# Patient Record
Sex: MALE | Race: WHITE | Employment: UNEMPLOYED | URBAN - METROPOLITAN AREA
[De-identification: names, ages, dates, MRNs, and addresses within clinical notes are randomized per-mention and may not be internally consistent; named-entity substitution may affect disease eponyms.]

---

## 2019-04-01 ENCOUNTER — APPOINTMENT (OUTPATIENT)
Dept: PHYSICAL THERAPY | Age: 44
End: 2019-04-01

## 2019-04-01 ENCOUNTER — HOSPITAL ENCOUNTER (OUTPATIENT)
Dept: PHYSICAL THERAPY | Age: 44
End: 2019-04-01

## 2019-04-09 ENCOUNTER — HOSPITAL ENCOUNTER (OUTPATIENT)
Dept: PHYSICAL THERAPY | Age: 44
Discharge: HOME | End: 2019-04-09
Payer: MEDICAID

## 2019-04-09 PROCEDURE — 97530 THERAPEUTIC ACTIVITIES: CPT

## 2019-04-09 PROCEDURE — 97110 THERAPEUTIC EXERCISES: CPT

## 2019-04-09 PROCEDURE — 97162 PT EVAL MOD COMPLEX 30 MIN: CPT

## 2019-04-09 PROCEDURE — 97166 OT EVAL MOD COMPLEX 45 MIN: CPT

## 2019-04-09 NOTE — PROGRESS NOTES
PT DAILY TREATMENT NOTE/NEURO EVAL 10-18 Patient Name: Paige Ewing Date:2019 : 1975 [x]  Patient  Verified Payor: [facility] MEDICAID / Plan: [employer] / Product Type: Managed Care Medicaid / In time:905  Out time:950 Total Treatment Time (min): 45 Visit #: 1 of - Treatment Area: Stroke [facility]) [I63.9] Weakness [R53.1] SUBJECTIVE Pain Level (0-10 scale): 0 
[]constant []intermittent []improving []worsening []no change since onset Any medication changes, allergies to medications, adverse drug reactions, diagnosis change, or new procedure performed?: [x] No    [] Yes (see summary sheet for update) Subjective functional status/changes:    
PLOF: independent bathing and dressing Limitations to PLOF:  
Mechanism of Injury: stroke  Current symptoms/Complaints: left LE weakness, difficulty walking and getting in and out of tub Previous Treatmnt/Compliance:reports he has not had PT before PMHx/Surgical Hx:  
Work Hx: does not work Living Situation: lives in group home Pt Goals: \" walk better\" Barriers: []pain []financial []time [x]transportation []other Motivation:  
Substance use: []Alcohol []Tobacco []other:  
FABQ Score: []low []elevate Cognition: A & O x 3    Other: OBJECTIVE/EXAMINATION Domestic Life:  
Activity/Recreational Limitations: watching TV Mobility: ambulates without AD, unsteady Self Care: independent 25 min [x]Eval                  []Re-Eval  
 
 
8 min Therapeutic Exercise:  [] See flow sheet : HEP, discussed POC, educated on tone and dorsiflexion ROM for gait, educated on falls risk Rationale: increase ROM, increase strength and improve balance to improve the patients ability to complete ADLs with ease.   
 
12 min Therapeutic Activity:  []  See flow sheet : 5x STS, trial of left AFO ambulating in hallway, trial of SPC with CGA for 30 ft x3  
 Rationale: increase ROM, increase strength, improve coordination, improve balance and increase proprioception  to improve the patients ability to ambulate safely. With 
 [] TE 
 [] TA 
 [] neuro 
 [] other: Patient Education: [x] Review HEP [] Progressed/Changed HEP based on:  
[] positioning   [] body mechanics   [] transfers   [] heat/ice application   
[] other:   
 
Other Objective/Functional Measures:  
 
Physical Therapy Evaluation  Neurologic Posture: [] Poor    [x] Fair    [] Good    Describe:   Slumped, rounded shoulders Gait: [] Normal    [x] Abnormal    Device: without AD, with SPC Describe:midfoot initial contact, decreased left knee flexion during swing, and slight hip circumduction to clear left foot ROM:                             AROM    PROM Shoulder Left Right Left Right Flex Ext ABD      
ER      
IR      
 
       AROM    PROM Knee Left Right Left Right Ext Flex AROM                           PROM Hip Left Right Left Right Flex Ext ABD      
ER      
IR      
 
                                       AROM      PROM Ankle Left Right Left Right  
dorsiflexion   Lacking 5 degrees Flex Strength (MMT): 
Shoulder L (1-5) R (1-5) Shoulder Flexion 4/5 Shoulder Ext Shoulder ABD 4/5 Shoulder ADD Shoulder IR Shoulder ER Hip L (1-5) R (1-5) Hip Flexion 4/5 Hip Ext Hip ABD Hip ADD Hip ER Hip IR Knee L (1-5) R (1-5) Knee Flexion 4/5 Knee Extension 4/5 Ankle PF Ankle DF 2+/5 Other Tone: increased tone left ankle Motor Control: 
 
Sensation: 
 
Reflexes: [] Not Tested Left Right Biceps (C5) Brachioradiais (C6) Triceps (C7) Knee Jerk (L4) Ankle Jerk (SI) Balance/ Equilibrium: LOB with tandem eyes open, intact stepping strategy Left            Right Tracks Across Midline Reaches Across Midline Sitting Balance: Static:  [x] Good    [] Fair    [] Poor Dynamic:   [x] Good    [] Fair    [] Poor Standing Balance: Static:   [] Good    [x] Fair    [] Poor Dynamic:   [] Good    [x] Fair    [x] Poor Protective Extension:  [] Present    [] Delayed    [] Absent Single Leg Stance: Eyes Open  Eyes Closed L  L   
R  R Functional Mobility Bed Mobility:   
  Scooting:  
     Rolling: 
     Sit-Supine: 
    Transfers: 
     Sit-Stand: 
     Floor-Stand:  
    Gait: 
     Tandem: 
     Backwards: 
     Braiding: 
    Elevations: 
     Curbs: 
    Ramps: 
    Stairs: 
Good left LE strength Impaired coordination Behavior: [x] Cooperative    [] Impulsive    [] Agitated    [] Perseverative    [] Confused Oriented x: 
 
Cognition: [x] One Step Commands   [x] Multiple Commands   [] Displays Neglect [] R  [] L Other:  
    Impaired Judgement: [] Y    [] N Impaired Vision:  [] Y    [] N Safety Awareness Deficits  [] Y    [] N Impaired Hearing  [] Y    [] N Able to Express Needs [] Y    [] N Optional Tests: 
     Dynamic Gait Index (24pt scale): Functional Gait Assessment (30pt scale): Blanchard Balance Scale (56pt scale): Other test /comments: LOB with tandem, + intact stepping Moderate sway with romberg eyes closed Sit to stand 5 in 30 seconds with 1 UE Unable to perform STS without UE's PROM left ankle lacking 5 degrees dorsiflexion AROM 2+/5 within available range dorsiflexion Pain Level (0-10 scale) post treatment: 0 
 
ASSESSMENT/Changes in Function: reviewed HEP.  
 
Patient will continue to benefit from skilled PT services to modify and progress therapeutic interventions, address functional mobility deficits, address ROM deficits, address strength deficits, analyze and address soft tissue restrictions, analyze and cue movement patterns, address imbalance/dizziness and instruct in home and community integration to attain remaining goals. [x]  See Plan of Care 
[]  See progress note/recertification 
[]  See Discharge Summary Progress towards goals / Updated goals: 
See POC PLAN 
[]  Upgrade activities as tolerated     [x]  Continue plan of care 
[]  Update interventions per flow sheet      
[]  Discharge due to:_ 
[]  Other:_   
 
John Frost, PT 4/9/2019  9:10 AM

## 2019-04-09 NOTE — PROGRESS NOTES
[facility] [address] [facility] 
[phone] fax Plan of Care/Statement of Necessity for Occupational Therapy Services Patient name: April Lee Start of Care: 2019 Referral source: Purvi Matthews MD : 1975 Medical Diagnosis: Stroke [facility]) [I63.9] Weakness [R53.1] Payor: [facility] MEDICAID / Plan: [facility] COMMUNITY PLAN [facility] / Product Type: Managed Care Medicaid /  Onset Date:2018 Treatment Diagnosis: Weakness left UE  
Prior Hospitalization: see medical history Provider#: [id number] Medications: Verified on Patient summary List  
 Comorbidities: HIV, Hepatitis C, depression Prior Level of Function: Lives in group home, I self care, receives help with home care, meals, transportation, enjoys movies, does not work The Plan of Care and following information is based on the information from the initial evaluation. Assessment/ key information: 38year old RHD male who is assisted in medical history by caregiver and medical chart. He is not able to identify when he had CVA-MD reports 2018. He is living in group home. He is independent in self care and receives help for all home care and transportation. He has full AROM of BUEs. Strength in left is grossly 4/5. His  is 40# ( right 62), pinches are WFL. His fine motor skills and in hand skills are slowed relative to right hand at 35 seconds for 9 hole peg test ( right 30), and 158 for [place] Rate of Manipulation ( right 113). He reports a 27% limitation in left UE function on the Neuro Quality of Life for UE. He will benefit from skilled occupational therapy to improve Left UE strength and fine motor speed.    
 
Evaluation Complexity: History MEDIUM Complexity : Expanded review of history including physical, cognitive and psychosocial  history  Examination MEDIUM Complexity : 3-5 performance deficits relating to physical, cognitive , or psychosocial skils that result in activity limitations and / or participation restrictions Clinical Decision Making MEDIUM Complexity : Patient may present with comorbidities that affect occupational performnce. Miniml to moderate modification of tasks or assistance (eg, physical or verbal ) with assesment(s) is necessary to enable patient to complete evaluation Overall Complexity Rating: MEDIUM Problem List: Decreased strength and Decreased coordination/prehension Treatment Plan may include any combination of the following: Therapeutic exercise, Therapeutic activities, Patient education and ADLs/IADLs Patient / Family readiness to learn indicated by: trying to perform skills Persons(s) to be included in education:   patient (P) and family support person (FSP);list caregiver Barriers to Learning/Limitations: yes;  cognitive Patient Goal (s): Get stronger Patient Self Reported Health Status: fair Rehabilitation Potential: good Short Term Goals: To be accomplished in 2 weeks: 1. Patient will be independent in home exercise program for left UE strengthening. 2.  Patient will be independet in [facility] for fine motor and in hand manipulation skills. Long Term Goals: To be accomplished in 4 weeks: 1. Patient will increase  strength in left hand by *10**pounds to increase ease of opening jars 2. Patient will increase fine motor skills in left  hand by 10% to ease fasteners 3. Patient will report  improved left hand use for lifting as showm by decrease in limitaiton to below 15%. Frequency / Duration: Patient to be seen 2 times per week for *4** weeks: 
 
Patient/ Caregiver education and instruction: Diagnosis, prognosis, activity modification and exercises 
 [x]  Plan of care has been reviewed with JOEL Nicholas 4/9/2019 12:02 PM 
 
_____________________________________________________________________ I certify that the above Therapy Services are being furnished while the patient is under my care. I agree with the treatment plan and certify that this therapy is necessary. [de-identified] Signature:____________Date:_________TIME:________ 
 
[employer], Date and Time must be completed for valid certification ** Please sign and return to [address] [facility] 
[phone] fax

## 2019-04-09 NOTE — PROGRESS NOTES
OT DAILY TREATMENT NOTE 10-18 Patient Name: John Paul Spring Date:2019 : 1975 [x]  Patient  Verified Payor: [facility] MEDICAID / Plan: [facility] [employer] COMM[employer]ITY [employer]AN [place] [employer] / Product Type: Managed Care Medicaid / In time:1035  Out time:1115 Total Treatment Time (min): 40 Visit #: 1 of 8Treatment Area: Stroke [facility]) [I63.9] Weakness [R53.1] SUBJECTIVE Pain Level (0-10 scale): 0/10 Any medication changes, allergies to medications, adverse drug reactions, diagnosis change, or new procedure performed?: [x] No    [] Yes (see summary sheet for update) Subjective functional status/changes:   [] No changes reported Patient unable to give medical history OBJECTIVE 20 min [x]Eval                  []Re-Eval  
 
 
20 min Therapeutic Exercise:  [] See flow sheet :  
Rationale: increase strength to improve the patients ability to leift left arm 
BUE exercises 1# x 10 each x 2 sets With 
 [x] TE 
 [] TA 
 [] neuro 
 [] other: Patient Education: [x] Review HEP [] Progressed/Changed HEP based on: UE HEP [] positioning   [] body mechanics   [] transfers   [] heat/ice application  
[] Splint wear/care   [] Sensory re-education   [] scar management     
[] other:   
 
     
Other Objective/Functional Measures:  
Subjective: pt is a left hand dominant, 44 y.o.y/o, male who sustained his/her injury 2018 . Prior level of function: Watch movies, I self care, help with home care Pain level:(0-no pain 10-debilitating pain) no  
  
Current functional limitations/living situation: Group home, Medical hx: AIDs, depression, Hep C, 
 
Medications: See the written copy of this report in the patient's paper medical record. Objective: 
 
Sensation:No changes noted 
binu of Motion:WFL Strength: grossly 4/5 left UE Hand ROM WNL Hand Strength: 
 Gross Grasp 3pt Pinch Lateral Pinch Tip Pinch Right  62 16 19 10 Left 40 13 18 10 Nine-Hole Peg Test:  Left= _35____seconds  Right=__30___seconds [place] Left  158    Right 113 Finger Opposition:WNL ADLs Feeding:        []MaxA   []ModA   []Xavier   [] CGA   []SBA   []Camilla   [x]Independent UE Dressing:       []MaxA   []ModA   []Xavier   [] CGA   []SBA   []Camilla   [x]Independent LE Dressing:       []MaxA   []ModA   []Xavier   [] CGA   []SBA   []Camilla   [x]Independent Grooming:       []MaxA   []ModA   []Xavier   [] CGA   []SBA   []Camilla   [x]Independent Toileting:       []MaxA   []ModA   []Xavier   [] CGA   []SBA   []Camilla   [x]Independent Bathing:       []MaxA   []ModA   []Xavier   [] CGA   []SBA   []Camilla   [x]Independent Light Meal Prep:    [x]MaxA   []ModA   []Xavier   [] CGA   []SBA   []Camilla   []Independent Household/Other:  [x]MaxA   []ModA   []Xavier   [] CGA   []SBA   []Camilla   []Independent Adaptive Equip:     []MaxA   []ModA   []Xavier   [] CGA   []SBA   []Camilla   []Independent Driving:       [x]MaxA   []ModA   []Xavier   [] CGA   []SBA   []Camilla   []Independent Money Mgmt:        [x]MaxA   []ModA   []Xavier   [] CGA   []SBA   []Camilla   []Independent Coordination   GM 
                         FM []WFL          [x] Impaired []WFL          [x] Impaired Tone/Motor Control []WFL          [x] Impaired Midline Symmetry []WFL          [x] Impaired Visual Perception:                    R/L Neglect R/L Discrimination Body Scheme [x]WFL          [] Impaired  
[x]WFL          [] Impaired  
 
[x]WFL          [] Impaired  
[x]WFL          [] Impaired Visual Motor Skills Tracking Tracing  
                         Writing  
[x]WFL          [] Impaired  
 
[x]WFL          [] Impaired  
[x]WFL          [] Impaired Cognition []Person         []Place            []Date  
[]Situation/ Behavior Follows Commands  []     1-step  [] 2-step   []Multi-step Memory: STM 
                           LTM []WFL          [] Impaired []WFL          [x] Impaired Safety Awareness []WFL          [x] Impaired Judgment  []WFL          [x] Impaired Express Needs []WFL          [x] Impaired Pain Level (0-10 scale) post treatment: *0/10** ASSESSMENT/Changes in Function: * [x]  See Plan of Care 
[]  See progress note/recertification 
[]  See Discharge Summary PLAN 
[]  Upgrade activities as tolerated     []  Continue plan of care 
[]  Update interventions per flow sheet      
[]  Discharge due to:_ 
[]  Other:_ MASSIMO Butt/L 4/9/2019  10:32 AM 
 
No future appointments.

## 2019-04-09 NOTE — PROGRESS NOTES
[facility] [address] [facility] 
[phone] fax Plan of Care/ Statement of Necessity for Physical Therapy Services Patient name: Chloe Valdez Start of Care: 2019 Referral source: Colt Patel MD : 1975 Medical Diagnosis: Stroke [facility]) [I63.9] Weakness [R53.1] Payor: [facility] MEDICAID / Plan: [facility] [employer] COMMUNITY [employer]AN [employer] [employer] / Product Type: Managed Care Medicaid /  Onset Date:3/15/2019 Treatment Diagnosis: left LE weakness; gait impairment Prior Hospitalization: see medical history Provider#: [id number] Medications: Verified on Patient summary List  
 Comorbidities: HIV/AIDS, depression, tobacco use, stroke, hepatitis Prior Level of Function: lives in group home, independent with bathing/dressing, enjoys watching tv, ambulates without AD The Plan of Care and following information is based on the information from the initial evaluation. Assessment/ key information: Patient is a 38year-old male who presents with chief compliant of left LE weakness and impaired gait. Patient reports history of stroke in 2018. He lives in a group home and is independent with self care. He ambulates without AD; unsteady and occasionally reaching for support in environment. He denies history of falls. He presents with good left LE strength, but decreased left dorsiflexion strength about 2+/5 in availible range. He presents with tight gastroc/soleus and increased tone; PROM lacking ~5 deg of dorsiflexion. He ambulates with midfoot initial contact, decreased left knee flexion during swing, and slight hip circumduction to clear left foot. Patient presents with impaired static and dynamic balance; intact stepping strategy with LOB during MSR with eyes open and moderate sway with Romberg eyes closed.  Patient is unable to perform sit to stand transfers without UE support; he completes 5 sit to  30 seconds with 1 UE. Patient will benefit from skilled PT to address impairments listed above in order to improve safety and ease of ambulation. Evaluation Complexity History HIGH Complexity :3+ comorbidities / personal factors will impact the outcome/ POC ; Examination MEDIUM Complexity : 3 Standardized tests and measures addressing body structure, function, activity limitation and / or participation in recreation  ;Presentation MEDIUM Complexity : Evolving with changing characteristics  ; Clinical Decision Making MEDIUM Complexity : FOTO score of 26-74 Overall Complexity Rating: MEDIUM Problem List: pain affecting function Treatment Plan may include any combination of the following: Therapeutic exercise, Therapeutic activities, Neuromuscular re-education, Physical agent/modality, Gait/balance training, Manual therapy, Patient education, Functional mobility training, Home safety training and Stair training Patient / Family readiness to learn indicated by: trying to perform skills Persons(s) to be included in education: patient (P) Barriers to Learning/Limitations: None Patient Goal (s): Doris Oneill Patient Self Reported Health Status: fair Rehabilitation Potential: good Short Term Goals: To be accomplished in 1 weeks: 1. Patient will be compliant with HEP to improve therapy outcomes. Long Term Goals: To be accomplished in 8 weeks: 1. Patient will be able to maintain MSR on floor/foam with eyes open to improve safety with ambulation on uneven surfaces. 2. Patient will ambulate 350 with LRAD, good gait pattern, and no evidence of instability to improve ease of ambulation. 3. Patient will complete 8 sit to stand transfers within 30 seconds using 1 UE to improve ease of daily transfers. 4. Patient will increase FOTO score by 10 points, 62/100, to show improved functional mobility and QOL. Frequency / Duration: Patient to be seen 2-3 times per week for 8 weeks. Patient/ CarPatient/ Caregiver education and instruction: Diagnosis, prognosis, brace/ splint application and exercises 
 [x]  Plan of care has been reviewed with JUAN CARLOS Arteaga, PT 4/9/2019 7:14 PM 
 
________________________________________________________________________ I certify that the above Therapy Services are being furnished while the patient is under my care. I agree with the treatment plan and certify that this therapy is necessary. [de-identified] Signature:____________Date:_________TIME:________ 
 
[employer], Date and Time must be completed for valid certification ** Please sign and return to [address] [facility] 
[phone] fax

## 2019-04-12 ENCOUNTER — HOSPITAL ENCOUNTER (OUTPATIENT)
Dept: PHYSICAL THERAPY | Age: 44
Discharge: HOME | End: 2019-04-12
Payer: MEDICAID

## 2019-04-12 PROCEDURE — 97112 NEUROMUSCULAR REEDUCATION: CPT

## 2019-04-12 NOTE — PROGRESS NOTES
PT DAILY TREATMENT NOTE 10-18 Patient Name: Misty Sherman Date:2019 : 1975 [x]  Patient  Verified Payor: [facility] MEDICAID / Plan: [facility] COMMUNITY PLAN [facility] / Product Type: Managed Care Medicaid / In time:1:02  Out time:1:35 Total Treatment Time (min): 33 Visit #: 2 of - Treatment Area: Stroke [facility]) [I63.9] Weakness [R53.1] SUBJECTIVE Pain Level (0-10 scale): 0/10 Any medication changes, allergies to medications, adverse drug reactions, diagnosis change, or new procedure performed?: [x] No    [] Yes (see summary sheet for update) Subjective functional status/changes:   [] No changes reported Pt reports no pain but that his left leg stays pretty numb. He has a hard time picking up his foot when he walks. He states he tried the stretch to his calf at home OBJECTIVE 33 min Neuromuscular Re-education:  [x]  See flow sheet :  
Rationale: increase ROM, increase strength, improve coordination, improve balance and increase proprioception  to improve the patients ability to ambulate with improved gait and perform transfers with improved left LE use With 
 [] TE 
 [] TA 
 [] neuro 
 [] other: Patient Education: [x] Review HEP [] Progressed/Changed HEP based on:  
[] positioning   [] body mechanics   [] transfers   [] heat/ice application   
[] other:   
 
Other Objective/Functional Measures: 
BP: 90/74 (asymptomatic, pt had eaten, pt mentioned smoking may have caused it to be lower) no baseline at initial evaluation HR: 103 Dominated by extensor tone in standing; lack of DF, left knee hyperextension and circumduction for decreased flexion during swing Able to perform Toe raise in sitting outside of synergy pattern with resistance Worked on \"unlocking\" left knee in standing with facilitated steps to improve gait pattern; challenged with carryover outside of bars Worked on sit to stand from standard chair with left leg back to increase weightbearing and proprioception; able to perform without UEs after cueing but challenged with eccentric control No LOB with EO romberg foam; cues to prevent knee hyperextension Pain Level (0-10 scale) post treatment: 0/10 ASSESSMENT/Changes in Function: Initiated exercise program per POC. Pt ambulating without AD but dominated by extensor synergy causing lack of DF, knee flexion during swing and left knee hyperextension at midstance. Will work to improve gait pattern for decreased fall risk and improve left LE strength for ease of transfers for better safety and independence. Short Term Goals: To be accomplished in 1 weeks: 1. Patient will be compliant with HEP to improve therapy outcomes. Met (4/12/19) Long Term Goals: To be accomplished in 8 weeks: 1. Patient will be able to maintain MSR on floor/foam with eyes open to improve safety with ambulation on uneven surfaces. 2. Patient will ambulate 350 with LRAD, good gait pattern, and no evidence of instability to improve ease of ambulation. 3. Patient will complete 8 sit to stand transfers within 30 seconds using 1 UE to improve ease of daily transfers. 4. Patient will increase FOTO score by 10 points, 62/100, to show improved functional mobility and QOL. PLAN [x]  Upgrade activities as tolerated     [x]  Continue plan of care 
[]  Update interventions per flow sheet      
[]  Discharge due to:_ 
[]  Other:_ Celio Xavier PTA 4/12/2019  1:57 PM 
 
Future Appointments Date Time Provider Susan Luther 4/16/2019  7:30 AM Denys Olivares PT MMCPTPB [facility]  
4/16/2019  8:15 AM Stepan Poplar, OTR/L [facility]CPTP[facility]  
4/23/2019 12:30 PM Andris Koyanagi, PTA [facility]  
4/30/2019  3:00 PM Andris Koyanagi, PTA [facility]CP[facility]  
4/30/2019  3:30 PM Stepan Poplar, OTR/L MMCPTPB [facility]  
5/3/2019  3:00 PM Andris Koyanagi, PTA [facility]CPTP[facility]  
5/3/2019  3:30 PM Stepan Alejandro, OTR/L [facility]CPTP[facility]  
5/6/2019 10:15 AM Jakub Nava MMCPTPB [facility]  
 5/6/2019 11:00 AM Blake Mccollum, PT [id number] [facility]  
5/10/2019  9:45 AM Senait Parry [id number] [facility]  
5/10/2019 10:30 AM Blake Mccollum, PT [id number] [facility]  
5/13/2019  2:00 PM Shane Mendes, PT [id number] [facility]  
5/13/2019  2:30 PM Kayleigh Wolf, OTR/L MMCPTPB [facility]  
5/17/2019 10:30 AM Shane Mendes, PT MMCPTPB [facility]  
5/20/2019 10:30 AM Marko Mcclure PTA [facility]CPTP[facility]  
5/24/2019 10:30 AM Farhad Green, PT MMCPTPB [facility]

## 2019-04-16 ENCOUNTER — HOSPITAL ENCOUNTER (OUTPATIENT)
Dept: PHYSICAL THERAPY | Age: 44
Discharge: HOME | End: 2019-04-16
Payer: MEDICAID

## 2019-04-16 PROCEDURE — 97116 GAIT TRAINING THERAPY: CPT

## 2019-04-16 PROCEDURE — 97110 THERAPEUTIC EXERCISES: CPT

## 2019-04-16 PROCEDURE — 97530 THERAPEUTIC ACTIVITIES: CPT

## 2019-04-16 NOTE — PROGRESS NOTES
PT DAILY TREATMENT NOTE 10-18 Patient Name: Cinthya Yu Date:2019 : 1975 [x]  Patient  Verified Payor: [facility] MEDICAID / Plan: [facility] COMMUNITY PLAN [facility] / Product Type: Managed Care Medicaid / In time:7:29  Out time:8:06 Total Treatment Time (min): 37 Visit #: 3 of  Treatment Area: Stroke [facility]) [I63.9] Weakness [R53.1] SUBJECTIVE Pain Level (0-10 scale): 0/10 Any medication changes, allergies to medications, adverse drug reactions, diagnosis change, or new procedure performed?: [x] No    [] Yes (see summary sheet for update) Subjective functional status/changes:   [] No changes reported Pt reports that he has not had any falls, but he stumbles. He has a wheelchair at home, but he does not have any other AD at home. His left shoe is too tight. His left shoe swells at times. OBJECTIVE 14 min Therapeutic Exercise:  [] See flow sheet :  
Rationale: increase ROM, increase strength, improve coordination, improve balance and increase proprioception to improve the patients ability to improve ease/safety with ambulation 23 min Gait Training: ambulation with SPC, SBQC, and SPC with AFO on left, Eliane to CGA Rationale: to improve safety with ambulation and reduce fall risk With 
 [] TE 
 [] TA 
 [] neuro 
 [] other: Patient Education: [x] Review HEP [] Progressed/Changed HEP based on:  
[] positioning   [] body mechanics   [] transfers   [] heat/ice application   
[] other:   
 
Other Objective/Functional Measures:  
 
Midfoot contact with initial contact and decreased left foot clearance with ambulation Improved foot clearance with SPC, required cuing for sequencing with SPC and to avoid placing SPC in front of right foot Trialed ambulation with SBQC but pt was challenged with sequencing and did not place all 4 points on ground Trailed AFO on left, increased left foot clearance when ambulating with SPC and AFO 
 Continued to require mild verbal cues for sequencing with SPC, but sequencing improved with continued reps. Left knee hyperextension during stance phase on left Left knee hyperextension with step ups, reduced with tactile cues at posterior knee No redness, tenderness, pitting edema, or increased temp B calves Educated pt regarding signs/sx of DVT and go to emergency room is signs/sx Pain Level (0-10 scale) post treatment: 0/10 ASSESSMENT/Changes in Function:  
 
Pt is making slow progress towards initial goals in therapy. He demonstrated increased left foot clearance when ambulating with left AFO and SPC on right. Genu recurvatum continues to be evident on left during stance phase; however, reduced hyperextension noted when provided with tactile cues at posterior knee when wearing AFO. Plantarflexor spasticity is likely contributing to knee hyperextension during stance phase. Will continue to address strength, proprioception, and static/dynamic balance deficits in order to reduce fall risk and improve ease/safety with daily tasks. Patient will continue to benefit from skilled PT services to modify and progress therapeutic interventions, address functional mobility deficits, address ROM deficits, address strength deficits, analyze and address soft tissue restrictions, analyze and cue movement patterns, assess and modify postural abnormalities, address imbalance/dizziness and instruct in home and community integration to attain remaining goals. Progress towards goals / Updated goals: 
1. Patient will be compliant with HEP to improve therapy outcomes. Met (4/12/19) Long Term Goals: To be accomplished in 8 weeks: 1. Patient will be able to maintain MSR on floor/foam with eyes open to improve safety with ambulation on uneven surfaces. 2. Patient will ambulate 350 with LRAD, good gait pattern, and no evidence of instability to improve ease of ambulation. Progressing.   Improved foot clearance on left when ambulation with left AFO and SPC held on right 4/16/19 3. Patient will complete 8 sit to stand transfers within 30 seconds using 1 UE to improve ease of daily transfers. 4. Patient will increase FOTO score by 10 points, 62/100, to show improved functional mobility and QOL. PLAN 
[]  Upgrade activities as tolerated     []  Continue plan of care 
[]  Update interventions per flow sheet      
[]  Discharge due to:_ 
[]  Other:_ Camila Mattson, PT 4/16/2019  8:09 AM 
 
Future Appointments Date Time Provider Susan Luther 4/16/2019  8:15 AM Leonel Marc, OTR/L MMCPTPB [facility]  
4/23/2019 12:30 PM Shona Warner, JUAN CARLOS MMCPTPB [facility]  
4/30/2019  3:00 PM Shona Warner, PTA MMCPTPB [facility]  
4/30/2019  3:30 PM Leonel Marc, OTR/L MMCPTPB [facility]  
5/3/2019  3:00 PM Shona Warner, JUAN CARLOS MMCPTPB [facility]  
5/3/2019  3:30 PM Leonel Marc OTR/L [facility]CPTP[facility]  
5/6/2019 10:15 AM Margeret Meter ROEOZIJ [facility]  
5/6/2019 11:00 AM Shawanda Lopez PT MMCPTPB [facility]  
5/10/2019  9:45 AM Margeret Meter [id number] [facility]  
5/10/2019 10:30 AM Shawanda Lopez PT [id number] [facility]  
5/13/2019  2:00 PM Chirag Mccloud, PT MMCPTPB [facility]  
5/13/2019  2:30 PM Leonel Marc, OTR/L [facility]CPTPB [facility]  
5/17/2019 10:30 AM Chirag Mccloud, PT MMCPTPB [facility]  
5/20/2019 10:30 AM Shona Warner, JUAN CARLOS MMCPTPB [facility]  
5/24/2019 10:30 AM Rubi Jones, PT MMCPTPB [facility]

## 2019-04-16 NOTE — PROGRESS NOTES
OT DAILY TREATMENT NOTE 10-18 Patient Name: Saran Wayne Date:2019 : 1975 [x]  Patient  Verified Payor: [facility] MEDICAID / Plan: [facility] COMMUNITY PLAN [facility] / Product Type: Managed Care Medicaid / In time:818  Out time:900 Total Treatment Time (min): 42 Visit #: 2 of 8 Treatment Area: Stroke [facility]) [I63.9] Weakness [R53.1] SUBJECTIVE Pain Level (0-10 scale): 0/10 Any medication changes, allergies to medications, adverse drug reactions, diagnosis change, or new procedure performed?: [x] No    [] Yes (see summary sheet for update) Subjective functional status/changes:   [] No changes reported Just tired Caregiver reports this is a little early for patient OBJECTIVE 30 min Therapeutic Exercise:  [] See flow sheet :  
Rationale: increase strength to improve the patients ability to lift Increased weights to 2# with no rests required x 15 Green therabar x 15 sup pro and flex ext shoulder level ER/IR x 15 Blue web  pull left 15, bow pull left 15, lumb pull left 15 Gripper 55# left hand x 25 1 inch pegs 12 min Therapeutic Activity:  []  See flow sheet :  
Rationale: improve coordination  to improve the patients ability to manipulate small items [employer] 10 sets each hand Placed 25 1 in pegs left hand With 
 [] TE 
 [] TA 
 [] neuro 
 [] other: Patient Education: [x] Review HEP [] Progressed/Changed HEP based on: UE HEP [] positioning   [] body mechanics   [] transfers   [] heat/ice application  
[] Splint wear/care   [] Sensory re-education   [] scar management     
[] other:   
 
     
Other Objective/Functional Measures:  
[place] left hand 171 ( right 189 but distracted by questions) Pain Level (0-10 scale) post treatment: 0/10 ASSESSMENT/Changes in Function: Improving strength Patient will continue to benefit from skilled OT services to address strength deficits and analyze and cue movement patterns to attain remaining goals. []  See Plan of Care 
[]  See progress note/recertification 
[]  See Discharge Summary Progress towards goals / Updated goals: *1.  Patient will be independent in home exercise program for left UE strengthening. reviewed 4/16/19 2. Patient will be independet in HEP for fine motor and in hand manipulation skills. 
  
Long Term Goals: To be accomplished in 4 weeks: 1. Patient will increase  strength in left hand by *10**pounds to increase ease of opening jars 2. Patient will increase fine motor skills in left  hand by 10% to ease fasteners 3. Patient will report  improved left hand use for lifting as showm by decrease in limitaiton to below 15%. 
 * 
 
PLAN 
[]  Upgrade activities as tolerated     [x]  Continue plan of care 
[]  Update interventions per flow sheet      
[]  Discharge due to:_ 
[]  Other:_ Joaquim Ramirez, OTR/L 4/16/2019  8:14 AM 
 
Future Appointments Date Time Provider Susan Luther 4/16/2019  8:15 AM Kayleigh Wolf, OTR/L MMCPTPB [facility]  
4/23/2019 12:30 PM Marko Mcclure, PTA MMCPTPB [facility]  
4/30/2019  3:00 PM Marko Mcclure, PTA MMCPTPB [facility]  
4/30/2019  3:30 PM Kayleigh Wolf, OTR/L MMCPTPB [facility]  
5/3/2019  3:00 PM Marko Mcclure, PTA MMCPTPB [facility]  
5/3/2019  3:30 PM Kayleigh Wolf, OTR/L MMCPTPB [facility]  
5/6/2019 10:15 AM Senait Parry SLSGGIT [facility]  
5/6/2019 11:00 AM Blake Mccollum PT MMCPTPB [facility]  
5/10/2019  9:45 AM Senait Parry [id number] [facility]  
5/10/2019 10:30 AM Blake Mccollum, PT [id number] [facility]  
5/13/2019  2:00 PM Shane Mendes, PT MMCPTPB [facility]  
5/13/2019  2:30 PM Kayleigh Wolf, OTR/L MMCPTPB [facility]  
5/17/2019 10:30 AM Shane Mendes, PT MMCPTPB [facility]  
5/20/2019 10:30 AM Marko Mcclure, PTA MMCPTPB [facility]  
5/24/2019 10:30 AM Farhad Green, PT MMCPTPB [facility]

## 2019-04-23 ENCOUNTER — APPOINTMENT (OUTPATIENT)
Dept: PHYSICAL THERAPY | Age: 44
End: 2019-04-23

## 2019-04-30 ENCOUNTER — HOSPITAL ENCOUNTER (OUTPATIENT)
Dept: PHYSICAL THERAPY | Age: 44
Discharge: HOME | End: 2019-04-30
Payer: MEDICAID

## 2019-04-30 PROCEDURE — 97530 THERAPEUTIC ACTIVITIES: CPT

## 2019-04-30 PROCEDURE — 97110 THERAPEUTIC EXERCISES: CPT

## 2019-04-30 PROCEDURE — 97112 NEUROMUSCULAR REEDUCATION: CPT

## 2019-05-06 ENCOUNTER — HOSPITAL ENCOUNTER (OUTPATIENT)
Dept: PHYSICAL THERAPY | Age: 44
Discharge: HOME | End: 2019-05-06
Payer: MEDICAID

## 2019-05-06 PROCEDURE — 97530 THERAPEUTIC ACTIVITIES: CPT

## 2019-05-06 PROCEDURE — 97535 SELF CARE MNGMENT TRAINING: CPT

## 2019-05-06 PROCEDURE — 97110 THERAPEUTIC EXERCISES: CPT

## 2019-05-06 NOTE — PROGRESS NOTES
OT DAILY TREATMENT NOTE 10-18 Patient Name: Paige Ewing Date:2019 : 1975 [x]  Patient  Verified Payor: [facility] MEDICAID / Plan: [facility] COMMUNITY PLAN [facility] / Product Type: Managed Care Medicaid / In time:1015  Out time:1055 Total Treatment Time (min): 40 Visit #: 4 of 8 Treatment Area: Stroke [facility]) [I63.9] Weakness [R53.1] SUBJECTIVE Pain Level (0-10 scale): 0/10 Any medication changes, allergies to medications, adverse drug reactions, diagnosis change, or new procedure performed?: [x] No    [] Yes (see summary sheet for update) Subjective functional status/changes:   [] No changes reported I don't feel any different I forgot my brace ( AFO) OBJECTIVE 22 min Therapeutic Exercise:  [] See flow sheet :  
Rationale: increase strength to improve the patients ability to grsp Recheck  left Blue therabar x 15 sup pro Black web  pull bow pull and lumb pull 10 min Therapeutic Activity:  []  See flow sheet :  
Rationale: improve coordination  to improve the patients ability to manipulate items Recheck 9 hole and [place] left hand 8 mins  Self Care FOTO to assess limitation in fine motor skills for self care With 
 [] TE 
 [] TA 
 [] neuro 
 [] other: Patient Education: [x] Review HEP [] Progressed/Changed HEP based on: progress made 
[] positioning   [] body mechanics   [] transfers   [] heat/ice application  
[] Splint wear/care   [] Sensory re-education   [] scar management     
[] other:   
 
     
Other Objective/Functional Measures:  
9 hole left 29 [place] 150  48 Pain Level (0-10 scale) post treatment: 0/10 ASSESSMENT/Changes in Function: Improving in hand and , function per [facility] Patient will continue to benefit from skilled OT services to modify and progress therapeutic interventions, address strength deficits, analyze and cue movement patterns and instruct in home and community integration to attain remaining goals. []  See Plan of Care 
[]  See progress note/recertification 
[]  See Discharge Summary Progress towards goals / Updated goals: *1.  Patient will be independent in home exercise program for left UE strengthening. reviewed 4/16/19 2.  Patient will be independent in HEP for fine motor and in hand manipulation skills. 
  
Long Term Goals: To be accomplished in 4 weeks:         
1.  Patient will increase  strength in left hand by *10**pounds to increase ease of opening jarsprogressing increased 8# 2.  Patient will increase fine motor skills in left  hand by 10% to ease fastenersprogressing 5/6/19 3.  Patient will report  improved left hand use for lifting as showm by decrease in limitaiton to below 15%. Met 5/6/19 ** PLAN 
[]  Upgrade activities as tolerated     [x]  Continue plan of care 
[]  Update interventions per flow sheet      
[]  Discharge due to:_ 
[]  Other:_ MASSIMO Gamez/L 5/6/2019  10:15 AM 
 
Future Appointments Date Time Provider Susan Luther 5/6/2019 11:00 AM Donaldo Sánchez, PT [id number] [facility]  
5/10/2019  9:45 AM Trude Mates [id number] [facility]  
5/10/2019 10:30 AM Donaldo Sánchez, PT [id number] [facility]  
5/13/2019  2:00 PM Min De La Rosa PT [id number] [facility]  
5/13/2019  2:30 PM Juan Rivera OTR/L MMCPTPB [facility]  
5/17/2019 10:30 AM Min De La Rosa PT MMCPTPB [facility]  
5/20/2019 10:30 AM Kush Gant, PTA MMCPTPB [facility]  
5/24/2019 10:30 AM Jeovanny Leon, PT MMCPTPB [facility]

## 2019-05-06 NOTE — PROGRESS NOTES
[facility] [address] [facility] 
[phone] fax Physical Therapy Progress Note Patient name: Paige Ewing Start of Care: 2019 Referral source: Laura Levine MD : 1975 Medical Diagnosis: Stroke [facility]) [I63.9] Weakness [R53.1] Payor: [facility] MEDICAID / Plan: [facility] COMMUNITY PLAN [facility] / Product Type: Managed Care Medicaid /  Onset Date:3/15/2019 Treatment Diagnosis: left LE weakness; gait impairment Prior Hospitalization: see medical history Provider#: [id number] Medications: Verified on Patient summary List  
 Comorbidities: HIV/AIDS, depression, tobacco use, stroke, hepatitis Prior Level of Function: lives in group home, independent with bathing/dressing, enjoys watching tv, ambulates without AD Visits from Start of Care: 5    Missed Visits: 1 Established Goals:         Excellent           Good         Limited           None 
[] Increased ROM   []  []  []  [] 
[] Increased Strength  []  []  []  [] 
[x] Increased Mobility  []  []  [x]  []  
[] Decreased Pain   []  []  []  [] 
[] Decreased Swelling  []  []  []  [] 
 
Key Functional Changes:  
Functional improvements: 0%; pt reports no improvements since start of care, he denies improvement in strength/balance/gait/ transfers Functional deficits: \" wants to walk right\" FOTO scored improved by 11 pts Pt able to complete 6x sit to  30 seconds with 1 UE( 5x at initial evaluation) Maintains MSR on floor for 15 seconds Updated Goals: to be achieved in 6 weeks: 1. Patient will be able to maintain MSR on floor/foam with eyes open for 30 seconds to improve safety with ambulation on uneven surfaces. 2. Patient will ambulate 350 with LRAD, good gait pattern, and no evidence of instability to improve ease of ambulation.   
3. Patient will complete 8 sit to stand transfers within 30 seconds using 1 UE to improve ease of daily transfers. ASSESSMENT/RECOMMENDATIONS: Patient is making slow progress towards long term goals. He demonstrates improved sit to  30 seconds with 1 UE and his FOTO score improved by 11 pts. However, pt reports 0% improvement and denies improved ease of transfers/gait/ ADL's. Patient demonstrates improved foot clearance when ambulating with left AFO and SPC. Patient reports he does not have any assisted devices at home. Patient will continue to benefit from skilled PT to improve gait pattern, proprioception, strength, and balance to improve ease and safety of community ambulation. Recommend pt obtain [facility] to improve carry-over. [x]Continue therapy per initial plan/protocol at a frequency of  2-3 x per week for 6 weeks []Continue therapy with the following recommended changes:____________________      _____________________________________________________________________ []Discontinue therapy progressing towards or have reached established goals []Discontinue therapy due to lack of appreciable progress towards goals []Discontinue therapy due to lack of attendance or compliance []Await Physician's recommendations/decisions regarding therapy []Other:________________________________________________________________ Thank you for this referral.  
Jeovanny Sanford, PT 5/6/2019 2:47 PM 
 
NOTE TO PHYSICIAN:  PLEASE COMPLETE THE ORDERS BELOW AND  
FAX TO [facility]: [phone] If you are unable to process this request in 24 hours please contact our office: [phone] ? I have read the above report and request that my patient continue as recommended. ? I have read the above report and request that my patient continue therapy with the following changes/special instructions:____________________________________ ? I have read the above report and request that my patient be discharged from therapy. Physicians signature: ______________________________Date: ______Time:______

## 2019-05-06 NOTE — PROGRESS NOTES
PT DAILY TREATMENT NOTE 10-18 Patient Name: Steven Allred Date:2019 : 1975 [x]  Patient  Verified Payor: [facility] MEDICAID / Plan: [facility] [employer] COMMUNITY PLAN [employer] / Product Type: Managed Care Medicaid / In time:1100  Out time:1131 Total Treatment Time (min): 31 Visit #: 5 of  Treatment Area: Stroke [facility]) [I63.9] Weakness [R53.1] SUBJECTIVE Pain Level (0-10 scale): 0/10 Any medication changes, allergies to medications, adverse drug reactions, diagnosis change, or new procedure performed?: [x] No    [] Yes (see summary sheet for update) Subjective functional status/changes:   [] No changes reported Pt reports that he does not use AFO when at home and does not have AD. OBJECTIVE 11 min Therapeutic Exercise:  [x] See flow sheet :  
Rationale: increase ROM, increase strength, improve coordination, improve balance and increase proprioception to improve the patients ability to improve ease/safety with ambulation 20 min Therapeutic activities: FOTO reassessment, 30 seconds sit to stand test, ambulation with SBQC/SPC with CGA Rationale: to improve ease of functional mobility, improve safety with ambulation and reduce fall risk With 
 [] TE 
 [] TA 
 [] neuro 
 [] other: Patient Education: [x] Review HEP [] Progressed/Changed HEP based on:  
[] positioning   [] body mechanics   [] transfers   [] heat/ice application   
[] other:   
 
Other Objective/Functional Measures:  
Functional improvements: 0 % pt reports no improvement since [facility], denies improvement in strength/balance/ADLs Functional deficits: \" wants to walk right\" Does not use AFO at home FOTO score improved by 11 pts Cues needed for sequencing when ambulating with cane Pain Level (0-10 scale) post treatment: 0/10 ASSESSMENT/Changes in Function:  See progress note.  
 
Patient will continue to benefit from skilled PT services to modify and progress therapeutic interventions, address functional mobility deficits, address ROM deficits, address strength deficits, analyze and address soft tissue restrictions, analyze and cue movement patterns, assess and modify postural abnormalities, address imbalance/dizziness and instruct in home and community integration to attain remaining goals. Progress towards goals / Updated goals: 
1. Patient will be compliant with HEP to improve therapy outcomes. Met (4/12/19) Long Term Goals: To be accomplished in 8 weeks: 1. Patient will be able to maintain MSR on floor/foam with eyes open to improve safety with ambulation on uneven surfaces. 15 seconds MSR on floor Progressing, able to maintain MSR on floor for about 15 seconds 2. Patient will ambulate 350 with LRAD, good gait pattern, and no evidence of instability to improve ease of ambulation. Progressing. Improved foot clearance on left when ambulation with left AFO and SPC held on right 4/16/19 3. Patient will complete 8 sit to stand transfers within 30 seconds using 1 UE to improve ease of daily transfers. Progressing, 6x with 1 UE  
4. Patient will increase FOTO score by 10 points, 62/100, to show improved functional mobility and QOL. Met, 11 pt improvement PLAN 
[]  Upgrade activities as tolerated     [x]  Continue plan of care 
[]  Update interventions per flow sheet      
[]  Discharge due to:_ 
[]  Other:_   
 
John Frost, PT 5/6/2019  8:09 AM 
 
Future Appointments Date Time Provider Susan Luther 5/6/2019 11:00 AM Steve Alexander PT [id number] [facility]  
5/10/2019  9:45 AM Martha Sevilla [id number] [facility]  
5/10/2019 10:30 AM Steve Alexander PT OIDCXNS [facility]  
5/13/2019  2:00 PM Rene Felton, PT VQHMRRVALARIE [facility]  
5/13/2019  2:30 PM Cristiane Solomon OTR/L MMCPTPB [facility]  
5/17/2019 10:30 AM Rene Felton PT MMCPTCARMEN [facility]  
5/20/2019 10:30 AM Heaven Tomlinson, PTA MMCPTPB [facility]  
5/24/2019 10:30 AM Mahsa Membreno, PT MMCPTPB [facility]

## 2019-05-10 ENCOUNTER — HOSPITAL ENCOUNTER (OUTPATIENT)
Dept: PHYSICAL THERAPY | Age: 44
End: 2019-05-10
Payer: MEDICAID

## 2019-05-10 ENCOUNTER — HOSPITAL ENCOUNTER (OUTPATIENT)
Dept: PHYSICAL THERAPY | Age: 44
Discharge: HOME | End: 2019-05-10
Payer: MEDICAID

## 2019-05-10 PROCEDURE — 97110 THERAPEUTIC EXERCISES: CPT

## 2019-05-10 PROCEDURE — 97530 THERAPEUTIC ACTIVITIES: CPT

## 2019-05-10 NOTE — PROGRESS NOTES
OT DAILY TREATMENT NOTE 10-18 Patient Name: Cinthya Yu Date:5/10/2019 : 1975 [x]  Patient  Verified Payor: [facility] MEDICAID / Plan: [facility] PLAN [facility] / Product Type: Managed Care Medicaid / In time:950  Out time:1027 Total Treatment Time (min): 37 Visit #: 5 of 8 Treatment Area: Stroke [facility]) [I63.9] Weakness [R53.1] SUBJECTIVE Pain Level (0-10 scale): 0/10 Any medication changes, allergies to medications, adverse drug reactions, diagnosis change, or new procedure performed?: [x] No    [] Yes (see summary sheet for update) Subjective functional status/changes:   [] No changes reported Pt reports non compliance with HEP OBJECTIVE 27 min Therapeutic Exercise:  [] See flow sheet :  
Rationale: increase ROM and increase strength to improve the patients ability to lift, , reach UE HEP with 2#, 10x each Blue Therabar: 3 ways, 10x Black Web: Lumb, grasp, bow 15x each 10 min Therapeutic Activity:  []  See flow sheet :  
Rationale: increase ROM and improve coordination  to improve the patients ability to manipulate small items [employer], Timed Grooved pegs, timed With 
 [] TE 
 [] TA 
 [] neuro 
 [] other: Patient Education: [x] Review HEP [] Progressed/Changed HEP based on:  
[] positioning   [] body mechanics   [] transfers   [] heat/ice application  
[] Splint wear/care   [] Sensory re-education   [] scar management     
[] other:   
 
     
Other Objective/Functional Measures:  
 
Grooved Pegs: 
 10 Change Right 107 Left 113 [phone] [facility] 10 sets  5/10 Change Right 189 Left 171 180 Pain Level (0-10 scale) post treatment: 0/10 ASSESSMENT/Changes in Function: Slowed FM speed on this date Patient will continue to benefit from skilled OT services to modify and progress therapeutic interventions, address ROM deficits, address strength deficits and instruct in home and community integration to attain remaining goals. []  See Plan of Care 
[]  See progress note/recertification 
[]  See Discharge Summary Progress towards goals / Updated goals: *1.  Patient will be independent in home exercise program for left UE strengthening. reviewed 4/16/19, Non compliant with HEP at this time per Pt report 5/10/19 2.  Patient will be independent in HEP for fine motor and in hand manipulation skills. 
  
Long Term Goals: To be accomplished in 4 weeks:         
1.  Patient will increase  strength in left hand by 10 pounds to increase ease of opening jars progressing with in clinic activities 5/10/19 2.  Patient will increase fine motor skills in left  hand by 10% to ease fasteners progressing 5/6/19 Decreased speed on this date 5/10/19 3.  Patient will report  improved left hand use for lifting as showm by decrease in limitaiton to below 15%. Met 5/6/19 PLAN 
[]  Upgrade activities as tolerated     [x]  Continue plan of care 
[]  Update interventions per flow sheet      
[]  Discharge due to:_ 
[]  Other:_ SHAHIDA Hernandez 5/10/2019  9:54 AM 
 
Future Appointments Date Time Provider Susan Luther 5/13/2019  2:00 PM Ar Salas, PT MMCPTPB [facility]  
5/13/2019  2:30 PM JOEL Viramontes MMCPTPB [facility]  
5/17/2019 10:30 AM Ar Salas, PT MMCPTPB [facility]  
5/20/2019 10:30 AM Jarad Diaz, PTA [facility]CPTPB [facility]  
5/24/2019 10:30 AM Sharri Yarbrough, PT MMCPTPB [facility]

## 2019-05-13 NOTE — PROGRESS NOTES
[facility] [address] [facility] 
[phone] fax Physical Therapy Discharge Summary Patient name: Jose Gibbons Start of Care:  19 Referral source: Agnes Anthony MD : 1975 Medical/Treatment Diagnosis: Stroke [facility]) [I63.9] Weakness [R53.1] Payor: [facility] MEDICAID / Plan: [facility] COMMUNITY PLAN [facility] / Product Type: Managed Care Medicaid /  Onset Date: 3/15/19 Prior Hospitalization: see medical history Provider#: [id number] Medications: Verified on Patient Summary List   
Comorbidities:  HIV/AIDS, depression, tobacco use, stroke, hepatitis  
Prior Level of Function:  lives in group home, independent with bathing/dressing, enjoys watching tv, ambulates without AD   
 
Visits from Start of Care: 5    Missed Visits: 4 Reporting Period :  19 to 19 Summary of Care: 
Goal: Patient will be able to maintain MSR on floor/foam with eyes open for 30 seconds to improve safety with ambulation on uneven surfaces.  
Status at last note/certification: 15 seconds Status at discharge: not met Goal:  Patient will ambulate 350 with LRAD, good gait pattern, and no evidence of instability to improve ease of ambulation. Status at last note/certification: unable Status at discharge: not met Goal: Patient will complete 8 sit to stand transfers within 30 seconds using 1 UE to improve ease of daily transfers.  
Status at last note/certification: 6x 
Status at discharge: not met Pt. Did not return to PT after last progress note, so will be D/C at this time. Goals were unable to be re-assessed secondary to unplanned D/C. He was educated on a HEP at start of care. Per last progress note \" Patient is making slow progress towards long term goals. He demonstrates improved sit to  30 seconds with 1 UE and his FOTO score improved by 11 pts.  However, pt reports 0% improvement and denies improved ease of transfers/gait/ ADL's. Patient demonstrates improved foot clearance when ambulating with left AFO and SPC. Patient reports he does not have any assisted devices at home. Patient will continue to benefit from skilled PT to improve gait pattern, proprioception, strength, and balance to improve ease and safety of community ambulation\" ASSESSMENT/RECOMMENDATIONS: 
[x]Discontinue therapy: []Patient has reached or is progressing toward set goals [x]Patient is non-compliant or has abdicated 
    []Due to lack of appreciable progress towards set goals Clay Noble, PT 5/13/2019 2:17 PM

## 2019-05-17 ENCOUNTER — APPOINTMENT (OUTPATIENT)
Dept: PHYSICAL THERAPY | Age: 44
End: 2019-05-17
Payer: MEDICAID

## 2019-05-20 ENCOUNTER — APPOINTMENT (OUTPATIENT)
Dept: PHYSICAL THERAPY | Age: 44
End: 2019-05-20
Payer: MEDICAID

## 2019-05-24 ENCOUNTER — APPOINTMENT (OUTPATIENT)
Dept: PHYSICAL THERAPY | Age: 44
End: 2019-05-24
Payer: MEDICAID

## 2019-05-29 NOTE — PROGRESS NOTES
[facility] Sridhar Montes  [address] [facility]  [phone] fax    Occupational Therapy Discharge Summary    Patient name: Arias Gray Start of Care: 2019   Referral source: Rylie Nicole MD : 1975   Medical/Treatment Diagnosis: Stroke ([address] [I63.9]  Weakness [R53.1]  Payor: [facility] MEDICAID / Plan: [facility] COMMUNITY PLAN [employer] / Product Type: Managed Care Medicaid /  Onset Date:2018     Prior Hospitalization: see medical history Provider#: [id number]   Medications: Verified on Patient Summary List     Prior Level of Function: Lives in group home, I self care, receives help with home care, meals, transportation, enjoys movies, does not work     Visits from Start of Care: 5    Missed Visits: 2  Reporting Period : 2019 to 5/10/2019    Summary of Care:  *1.  Patient will be independent in home exercise program for left UE strengthening. reviewed 19, Non compliant with HEP at this time per Pt report 5/10/19  2.  Patient will be independent in HEP for fine motor and in hand manipulation skills. Long Term Goals: To be accomplished in 4 weeks:          1.  Patient will increase  strength in left hand by 10 pounds to increase ease of opening jars progressing with in clinic activities 5/10/19  2.  Patient will increase fine motor skills in left  hand by 10% to ease fasteners progressing 19 Decreased speed on this date 5/10/19  3.  Patient will report  improved left hand use for lifting as showm by decrease in limitaiton to below 15%. Met 19  ASSESSMENT/RECOMMENDATIONS: unable to further assess goals due to pt abdicated therapy. Attempted to reach patient to continue with POC, pt failed to return. Will d/c at this time and reassess in the future if medically necessary.   Thank you for this referral.   [x]Discontinue therapy: []Patient has reached or is progressing toward set goals      [x]Patient is non-compliant or has abdicated      []Due to lack of appreciable progress towards set goals    Crispin Wayne OT 5/29/2019 3:15 PM

## 2019-10-25 ENCOUNTER — OFFICE VISIT (OUTPATIENT)
Dept: NEUROLOGY | Age: 44
End: 2019-10-25

## 2019-10-25 VITALS
WEIGHT: 199 LBS | HEIGHT: 69 IN | OXYGEN SATURATION: 98 % | DIASTOLIC BLOOD PRESSURE: 60 MMHG | HEART RATE: 82 BPM | BODY MASS INDEX: 29.47 KG/M2 | TEMPERATURE: 98.6 F | RESPIRATION RATE: 18 BRPM | SYSTOLIC BLOOD PRESSURE: 90 MMHG

## 2019-10-25 DIAGNOSIS — G96.9: Primary | ICD-10-CM

## 2019-10-25 DIAGNOSIS — B20: Primary | ICD-10-CM

## 2019-10-25 DIAGNOSIS — I63.9 CEREBROVASCULAR ACCIDENT (CVA), UNSPECIFIED MECHANISM (HCC): ICD-10-CM

## 2019-10-25 RX ORDER — FOLIC ACID 1 MG/1
TABLET ORAL DAILY
COMMUNITY

## 2019-10-25 RX ORDER — DULOXETIN HYDROCHLORIDE 60 MG/1
60 CAPSULE, DELAYED RELEASE ORAL DAILY
COMMUNITY

## 2019-10-25 RX ORDER — LANOLIN ALCOHOL/MO/W.PET/CERES
CREAM (GRAM) TOPICAL DAILY
COMMUNITY

## 2019-10-25 RX ORDER — ATORVASTATIN CALCIUM 10 MG/1
TABLET, FILM COATED ORAL DAILY
COMMUNITY
End: 2020-06-05 | Stop reason: SDUPTHER

## 2019-10-25 RX ORDER — ASPIRIN 325 MG
325 TABLET ORAL DAILY
COMMUNITY

## 2019-10-25 NOTE — PROGRESS NOTES
Chief Complaint   Patient presents with    Establish Care     new patient    Stroke     Hx of strokes       Anton Dia presents today for   Chief Complaint   Patient presents with    Establish Care     new patient    Stroke     Hx of strokes       Is someone accompanying this pt? YES, cARE GIVER. Is the patient using any DME equipment during OV? NO    Depression Screening:  3 most recent PHQ Screens 10/25/2019   Little interest or pleasure in doing things Not at all   Feeling down, depressed, irritable, or hopeless Not at all   Total Score PHQ 2 0       Learning Assessment:  No flowsheet data found. Abuse Screening:  No flowsheet data found. Fall Risk  No flowsheet data found. Coordination of Care:  1. Have you been to the ER, urgent care clinic since your last visit? Hospitalized since your last visit? NO    2. Have you seen or consulted any other health care providers outside of the [address] [place] [address] since your last visit? Include any pap smears or colon screening.  YES, Simon Alvarado

## 2019-10-25 NOTE — LETTER
10/25/19 Patient: Julia Schafer YOB: 1975 Date of Visit: 10/25/2019 Haylee Blake MD 
[address] 
[address] [facility] [address] VIA Facsimile: [phone] Dear Haylee Blake MD, Thank you for referring Mr. Gustavo Perea to [facility] for evaluation. My notes for this consultation are attached. If you have questions, please do not hesitate to call me. I look forward to following your patient along with you. Sincerely, Mira Lynne MD

## 2019-10-25 NOTE — PATIENT INSTRUCTIONS
Thank you for choosing [address] [place] [address] and [address] [place] [address] Neurology Clinic for your     care. You may receive a survey about your visit. We appreciate you taking time     to complete this survey as we use your feedback to improve our services. We     realize we are not perfect, but we strive to provide excellent care.

## 2019-10-25 NOTE — PROGRESS NOTES
Chente Ayon is a 38 y.o. male . presents for Establish Care (new patient) and Stroke (Hx of strokes)   . Mr. Clif Wills is a 38years old male patient with HIV (last CD4 count was 360 in the HIV viral load was undetectable), previous stroke with residual left-sided weakness, and substance abuse (cocaine and marijuana), and HCV infection(cured)came for evaluation of his stroke. He came today with his mental health care taker. According to his caretaker he had multiple strokes 1 and half years ago which left him with residual left-sided body weakness. No recent worsening in the weakness. He also has mild membranous of the left side of his body. In addition he has mild pain on left foot. He has no marked difficulty with his speech. No difficulty swallowing. He has difficulty walking and drags is left leg. He is not using any support. No falls. He denied any difficulty with his memory but his caretaker mentioned that he sometimes forgets things done in the day and sometimes might forget the day. He is independent in most of his ADLs. He lives with his sister. She is manages his finances. In May 2018 he was admitted to [facility] for altered mental symptoms, slurring of speech and left-sided weakness and numbness. His MRI showed scattered foci of acute infarction with mild regional mass effect involving the subcortical white matter and basal ganglia. His work-up for a prosthetic infection of the lumbar puncture was negative. Had an unremarkable echocardiography and carotid Doppler ultrasound. His urine exam was positive for cocaine and marijuana. He is currently taking his HIV medications properly with a rise his CD4 count and undetectable viral load. Review of Systems   Constitutional: Positive for malaise/fatigue. Negative for chills, diaphoresis, fever and weight loss. HENT: Negative for ear pain, hearing loss, nosebleeds and tinnitus.     Eyes: Negative for blurred vision and double vision. Respiratory: Negative for cough, shortness of breath and wheezing. Cardiovascular: Negative for chest pain, palpitations and leg swelling. Gastrointestinal: Negative for blood in stool, constipation, diarrhea, heartburn, nausea and vomiting. Genitourinary: Negative for dysuria, hematuria and urgency. Skin: Negative for itching and rash. Neurological: Positive for weakness. Negative for seizures. Psychiatric/Behavioral: Negative for depression, hallucinations and memory loss. The patient does not have insomnia. Past Medical History:   Diagnosis Date    Cerebral artery occlusion with cerebral infarction [facility])     Neurological disorder        History reviewed. No pertinent surgical history. History reviewed. No pertinent family history.      Social History     Socioeconomic History    Marital status: UNKNOWN     Spouse name: Not on file    Number of children: Not on file    Years of education: Not on file    Highest education level: Not on file   Occupational History    Not on file   Social Needs    Financial resource strain: Not on file    Food insecurity:     Worry: Not on file     Inability: Not on file    Transportation needs:     Medical: Not on file     Non-medical: Not on file   Tobacco Use    Smoking status: Current Every Day Smoker    Smokeless tobacco: Never Used   Substance and Sexual Activity    Alcohol use: Not Currently     Frequency: Never    Drug use: Not on file    Sexual activity: Not on file   Lifestyle    Physical activity:     Days per week: Not on file     Minutes per session: Not on file    Stress: Not on file   Relationships    Social connections:     Talks on phone: Not on file     Gets together: Not on file     Attends Yarsanism service: Not on file     Active member of club or organization: Not on file     Attends meetings of clubs or organizations: Not on file     Relationship status: Not on file    Intimate partner violence:     Fear of current or ex partner: Not on file     Emotionally abused: Not on file     Physically abused: Not on file     Forced sexual activity: Not on file   Other Topics Concern    Not on file   Social History Narrative    Not on file        No Known Allergies      Current Outpatient Medications   Medication Sig Dispense Refill    folic acid (FOLVITE) 1 mg tablet Take  by mouth daily.  abacavir/dolutegravir/lamivudi (TRIUMEQ PO) Take  by mouth.  atorvastatin (LIPITOR) 10 mg tablet Take  by mouth daily.  ergocalciferol, vitamin D2, (VITAMIN D2 PO) Take  by mouth.  DULoxetine (CYMBALTA) 60 mg capsule Take 60 mg by mouth daily.  aspirin (ASPIRIN) 325 mg tablet Take 325 mg by mouth daily.  thiamine HCL (VITAMIN B-1) 100 mg tablet Take  by mouth daily. Physical Exam   Constitutional: No distress. HENT:   Head: Normocephalic and atraumatic. Eyes: Pupils are equal, round, and reactive to light. Conjunctivae and EOM are normal.   Neck: Normal range of motion. Neck supple. Cardiovascular: Normal rate and regular rhythm. Exam reveals friction rub. No murmur heard. Pulmonary/Chest: Effort normal and breath sounds normal. No respiratory distress. He has no wheezes. He has no rales. Musculoskeletal: Normal range of motion. He exhibits no edema or deformity. Neurological:   Mental status: Awake, alert, oriented to person, place, month/day/date/ and year; doesn't know the president;  follows simple and complex commands;  no neglect, no extinction to DSS or VSS;  immediate recall 3/3 and delayed recall 0/3. Attention to spelling 'world' backwards is impaired. MMSE score is 23/30.    Speech and languge: fluent, coherent, naming and repitition intact, reading and comprehension intact  CN: VFF, EOMI, PERRLA, face sensation intact , no facial asymmetry noted, palate elevation symmetric bilat, SS+SCM 5/5 bilat, tongue midline  Motor: mild pronator drift on the LUE; increased tone over the left UE and LE. Strength is 5/5 on the right side. On the left side: 5/5 on elbow flexion and 4+/5 with extension, and hand ; 5/5 hip flexion, knee extension; 4+/5 with knee extension; 3/5 with dorsiflexion and 4/5 with plantar flexion. Sensory: intact to light touch, PP, vibration, and position sneses throughout  Coordination: FNF, HS accurate w/o dysmetria including the left side for the degree of disability. DTR: 2+ on the right side; 3 + on the left side except at the ankle 4+ with clonus. ; up going plantar on the left. Gait: Hemiplegic; marked difficulty lifting his foot up on the left. Skin: He is not diaphoretic. No visits with results within 3 Month(s) from this visit. Latest known visit with results is:   No results found for any previous visit. ICD-10-CM ICD-9-CM    1. 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 Cerebrovascular accident (CVA), unspecified mechanism ([facility] [address] I63.9 434.91 MRI BRAIN WO CONT      Leg Brace (ANKLE BRACE) [facility]     A 38years old male patient with history of HIV on treatment, substance abuse, stroke with residual left-sided weakness, and history of hepatitis C virus infection came for evaluation of his past stroke. He had multiple admissions to [facility].  His MRI in the past that showed acute strokes which are subcortical.  Previous echocardiography and carotid studies were unremarkable. He also had lumbar puncture which did not show any infection. His risk factors for stroke include HIV infection, substance abuse, and HCV infection. His current Mini-Mental Status Examination score is 23. 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 I have ordered MRI of his brain to see the changes over the past 1 and half years. In addition he was advised to use a cane for walking to avoid falling.   He was also offered an ankle brace as he has marked problem with dorsiflexion. We will see him in 6 months time.

## 2019-11-06 ENCOUNTER — HOSPITAL ENCOUNTER (OUTPATIENT)
Age: 44
Discharge: HOME | End: 2019-11-06
Attending: STUDENT IN AN ORGANIZED HEALTH CARE EDUCATION/TRAINING PROGRAM
Payer: MEDICAID

## 2019-11-06 DIAGNOSIS — B20: ICD-10-CM

## 2019-11-06 DIAGNOSIS — I63.9 CEREBROVASCULAR ACCIDENT (CVA), UNSPECIFIED MECHANISM (HCC): ICD-10-CM

## 2019-11-06 DIAGNOSIS — G96.9: ICD-10-CM

## 2019-11-06 PROCEDURE — 70551 MRI BRAIN STEM W/O DYE: CPT

## 2020-04-27 ENCOUNTER — VIRTUAL VISIT (OUTPATIENT)
Dept: NEUROLOGY | Age: 45
End: 2020-04-27

## 2020-04-27 VITALS — BODY MASS INDEX: 29.47 KG/M2 | HEIGHT: 69 IN | WEIGHT: 199 LBS

## 2020-04-27 NOTE — PROGRESS NOTES
Teodoro Kehr is a 54 y.o. male patient on virtual visit today for follow-up on HIV-associated central nervous system disease. Patient will use mobile number [phone] for today's visit.

## 2020-06-05 ENCOUNTER — OFFICE VISIT (OUTPATIENT)
Dept: NEUROLOGY | Age: 45
End: 2020-06-05

## 2020-06-05 VITALS
HEIGHT: 68 IN | HEART RATE: 87 BPM | SYSTOLIC BLOOD PRESSURE: 106 MMHG | BODY MASS INDEX: 31.52 KG/M2 | TEMPERATURE: 98.3 F | DIASTOLIC BLOOD PRESSURE: 76 MMHG | WEIGHT: 208 LBS | OXYGEN SATURATION: 96 %

## 2020-06-05 DIAGNOSIS — I63.9 CEREBROVASCULAR ACCIDENT (CVA), UNSPECIFIED MECHANISM (HCC): Primary | ICD-10-CM

## 2020-06-05 DIAGNOSIS — B20 HIV DEMENTIA (HCC): ICD-10-CM

## 2020-06-05 DIAGNOSIS — F02.80 HIV DEMENTIA (HCC): ICD-10-CM

## 2020-06-05 RX ORDER — ATORVASTATIN CALCIUM 10 MG/1
10 TABLET, FILM COATED ORAL DAILY
Qty: 30 TAB | Refills: 5 | Status: SHIPPED | OUTPATIENT
Start: 2020-06-05 | End: 2020-07-05

## 2020-06-05 RX ORDER — GABAPENTIN 100 MG/1
CAPSULE ORAL 2 TIMES DAILY
COMMUNITY

## 2020-06-05 RX ORDER — BUPROPION HYDROCHLORIDE 150 MG/1
TABLET, EXTENDED RELEASE ORAL
COMMUNITY
Start: 2019-07-17

## 2020-06-05 NOTE — PROGRESS NOTES
Chelsy Bhardwaj presents today for   Chief Complaint   Patient presents with    Neurologic Problem     follow up       Is someone accompanying this pt? Yes sisiter,    Is the patient using any DME equipment during OV? no    Depression Screening:  3 most recent PHQ Screens 10/25/2019   Little interest or pleasure in doing things Not at all   Feeling down, depressed, irritable, or hopeless Not at all   Total Score PHQ 2 0       Learning Assessment:  No flowsheet data found. Abuse Screening:  No flowsheet data found. Fall Risk  No flowsheet data found. Coordination of Care:  1. Have you been to the ER, urgent care clinic since your last visit? Hospitalized since your last visit? no    2. Have you seen or consulted any other health care providers outside of the [address] [place] [address] since your last visit? Include any pap smears or colon screening.  no

## 2020-06-05 NOTE — PROGRESS NOTES
Lourdes Ayala is a 54 y.o. male . presents for Neurologic Problem (follow up)   . HPI  Mr. Anthony Antunez is a 54years old male patient with HIV (last CD4 count was 360 in the HIV viral load was undetectable), previous stroke with residual left-sided weakness, and substance abuse (cocaine and marijuana), and HCV infection(cured)came for follow-up of  his stroke. He came today with his sister with whom he lives currently. He continues to have the weakness on the left side of his body. Has difficulty using his left upper extremity. He drags his left lower extremity when walking. He is not using any support. He had episodes of fall; despite this, he refuses to use a cane or walker. Feels numb on the left upper and lower extremities. His speech is clear. No difficulty swallowing or drooling. No dizziness. No passing out spells. He forgets: Asking the same thing again and again, and names. He takes his medications properly [but his sister has to arrange them in a pillbox]. He does not have any new illnesses. He is taking his HIV  medications properly. Undetectable viral load. In May 2018 he was admitted to [facility] for altered mental symptoms, slurring of speech and left-sided weakness and numbness. His MRI showed scattered foci of acute infarction with mild regional mass effect involving the subcortical white matter and basal ganglia. His work-up for a prosthetic infection of the lumbar puncture was negative. Had an unremarkable echocardiography and carotid Doppler ultrasound. His urine exam was positive for cocaine and marijuana.           Review of Systems   Constitutional: Positive for malaise/fatigue. Negative for chills, fever and weight loss. HENT: Negative for hearing loss and tinnitus. Eyes: Negative for blurred vision and double vision. Respiratory: Negative for cough and shortness of breath. Cardiovascular: Negative for chest pain and leg swelling.    Gastrointestinal: Negative for heartburn, nausea and vomiting. Genitourinary: Negative for dysuria, frequency, hematuria and urgency. Musculoskeletal: Negative for back pain, joint pain, myalgias and neck pain. Skin: Negative for itching and rash. Neurological: Positive for sensory change (numbness), speech change (mild slurring) and focal weakness (left side). Negative for dizziness, tingling, tremors, seizures, loss of consciousness and headaches. Endo/Heme/Allergies: Does not bruise/bleed easily. Psychiatric/Behavioral: Positive for depression and memory loss. Negative for suicidal ideas. The patient is not nervous/anxious and does not have insomnia. Past Medical History:   Diagnosis Date    Cerebral artery occlusion with cerebral infarction [facility])     Neurological disorder        History reviewed. No pertinent surgical history. History reviewed. No pertinent family history.      Social History     Socioeconomic History    Marital status: UNKNOWN     Spouse name: Not on file    Number of children: Not on file    Years of education: Not on file    Highest education level: Not on file   Occupational History    Not on file   Social Needs    Financial resource strain: Not on file    Food insecurity     Worry: Not on file     Inability: Not on file    Transportation needs     Medical: Not on file     Non-medical: Not on file   Tobacco Use    Smoking status: Current Every Day Smoker    Smokeless tobacco: Never Used   Substance and Sexual Activity    Alcohol use: Not Currently     Frequency: Never    Drug use: Not on file    Sexual activity: Not on file   Lifestyle    Physical activity     Days per week: Not on file     Minutes per session: Not on file    Stress: Not on file   Relationships    Social connections     Talks on phone: Not on file     Gets together: Not on file     Attends Gnosticist service: Not on file     Active member of club or organization: Not on file     Attends meetings of clubs or organizations: Not on file     Relationship status: Not on file    Intimate partner violence     Fear of current or ex partner: Not on file     Emotionally abused: Not on file     Physically abused: Not on file     Forced sexual activity: Not on file   Other Topics Concern    Not on file   Social History Narrative    Not on file        Allergies   Allergen Reactions    Sting, Bee Swelling         Current Outpatient Medications   Medication Sig Dispense Refill    buPROPion SR (WELLBUTRIN SR) 150 mg SR tablet Take  by mouth.  gabapentin (NEURONTIN) 100 mg capsule Take  by mouth two (2) times a day.  atorvastatin (LIPITOR) 10 mg tablet Take 1 Tab by mouth daily for 30 days. 30 Tab 5    folic acid (FOLVITE) 1 mg tablet Take  by mouth daily.  abacavir/dolutegravir/lamivudi (TRIUMEQ PO) Take  by mouth.  ergocalciferol, vitamin D2, (VITAMIN D2 PO) Take  by mouth.  DULoxetine (CYMBALTA) 60 mg capsule Take 60 mg by mouth daily.  aspirin (ASPIRIN) 325 mg tablet Take 325 mg by mouth daily.  thiamine HCL (VITAMIN B-1) 100 mg tablet Take  by mouth daily.  Leg Brace (ANKLE BRACE) misc by Does Not Apply route. Patient has previous stroke and left leg weakness which is marked at the ankle. Needs ankle brace for walking. 1 Each 0         Physical Exam   Head: Normocephalic and atraumatic. Eyes: Pupils are equal, round, and reactive to light. Conjunctivae and EOM are normal.   Neck: Normal range of motion. Neck supple. Cardiovascular: Normal rate and regular rhythm. No murmur heard. Pulmonary/Chest: Effort normal and breath sounds normal. No respiratory distress. He has no wheezes. He has no rales. Musculoskeletal: Normal range of motion. He exhibits no edema or deformity.    Neurological:   Mental status: Awake, alert, oriented to person, place, day/month// and year but not the date; doesn't know the president;  follows simple and complex commands;  no neglect, no extinction to DSS or VSS;  immediate recall 3/3 and delayed recall 1/3. Nurys Calderon Speech and languge: fluent, coherent,and comprehension intact  CN: VFF, EOMI, PERRLA, face sensation intact , no facial asymmetry noted, palate elevation symmetric bilat, SS+SCM 5/5 bilat, tongue midline  Motor: mild pronator drift on the LUE; increased tone over the left UE and LE. Strength is 5/5 on the right side. On the left side: 5/5 on elbow flexion and 4+/5 with extension, and hand ; 5/5 hip flexion, knee extension; 4+/5 with knee extension; 3/5 with dorsiflexion and 4/5 with plantar flexion. Sensory: intact to light touch, PP, vibration, and position sneses throughout  Coordination: FNF, HS accurate w/o dysmetria including the left side for the degree of disability. DTR: 2+ on the right side; 3 + on the left side except at the ankle 4+ with clonus. ; up going plantar on the left. Gait: Hemiplegic; marked difficulty lifting his foot up on the left. No visits with results within 3 Month(s) from this visit. Latest known visit with results is:   No results found for any previous visit. Result Information     Status: Final result (Exam End: 11/6/2019 09:49) Provider Status: Reviewed   Result Notes for MRI BRAIN WO CONT     Notes recorded by Aries Ackerman LPN on 22/67/7416 at 12:10 PM EST  Spoke with patient, made aware of provider's comments and reminder to schedule follow-up visit.          Study Result     Brain MR without contrast     HISTORY: Patient with HIV, previous stroke and cognitive dysfunction.     COMPARISON: None available     TECHNIQUE: Brain scanned with sagittal and axial T1W scans, axial T2W , axial  FLAIR, axial diffusion weighted images and SWAN.    FINDINGS:      Old lacunar stroke in the globus pallidus of the right basal ganglia. Associated  susceptibility artifact consistent with hemosiderin staining. Old lacunar stroke  in the anterior left thalamus. Old lacunar stroke in the left lateral briseida. No  restricted diffusion lesions to suggest an acute stroke. Expected arterial flow  voids are present at the base of brain. 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 Cyst contacts the dorsal tectal plate but without significant mass effect. Borderline to mildly age advanced cerebral atrophy. No hydrocephalus. No mass  lesion. Nonpathologic marrow signal. Paranasal sinuses and mastoid air cells are  clear.     IMPRESSION  IMPRESSION:     1. No acute brain disease.     2. Old lacunar strokes in the right basal ganglia, left thalamus and left briseida.     3. Borderline to mildly age advanced cerebral atrophy.     4. Benign pineal cyst.           ICD-10-CM ICD-9-CM    1. Cerebrovascular accident (CVA), unspecified mechanism ([facility] I63.9 434.91 atorvastatin (LIPITOR) 10 mg tablet    Possibly cardio-embolic   2. HIV dementia ([facility] B20 56     F02.80 294.10          A 54years old male patient with history of HIV on treatment, substance abuse, stroke with residual left-sided weakness, and history of hepatitis C virus infection came for follow up of  stroke. He had multiple admissions to [facility].  His MRI in the past had shown acute strokes which are subcortical.  Previous echocardiography and carotid studies were unremarkable. His risk factors for stroke include HIV infection, substance abuse, and HCV infection. 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 MRI of the brain November 2019 showed old lacunar strokes involving the right thalamus, left frontal ganglia, and left briseida. Did not show any acute stroke or inflammatory lesions. We will continue with aspirin and atorvastatin [I reordered his atorvastatin 10 mg p.o. per day]. Again, I strongly advised him to use a cane when walking in order to avoid falling. Will see him as needed in 6 months time.   He will continue follow-up with his primary care provider and infectious disease.

## 2020-12-29 ENCOUNTER — TELEPHONE (OUTPATIENT)
Dept: NEUROLOGY | Age: 45
End: 2020-12-29

## 2020-12-29 NOTE — TELEPHONE ENCOUNTER
Received Fax per [facility] Request for Atorvastatin 10 mg. MD to advise last refill 6/05/ 20.

## 2021-01-05 RX ORDER — ATORVASTATIN CALCIUM 10 MG/1
TABLET, FILM COATED ORAL
COMMUNITY
Start: 2018-07-06 | End: 2021-01-05 | Stop reason: SDUPTHER

## 2021-01-05 RX ORDER — ATORVASTATIN CALCIUM 10 MG/1
10 TABLET, FILM COATED ORAL DAILY
Qty: 30 TAB | Refills: 5 | Status: SHIPPED | OUTPATIENT
Start: 2021-01-05 | End: 2021-02-04

## 2022-06-06 ENCOUNTER — OFFICE VISIT (OUTPATIENT)
Dept: NEUROLOGY | Age: 47
End: 2022-06-06
Payer: MEDICAID

## 2022-06-06 VITALS
BODY MASS INDEX: 29.7 KG/M2 | WEIGHT: 196 LBS | OXYGEN SATURATION: 96 % | DIASTOLIC BLOOD PRESSURE: 88 MMHG | HEIGHT: 68 IN | RESPIRATION RATE: 20 BRPM | SYSTOLIC BLOOD PRESSURE: 110 MMHG | HEART RATE: 84 BPM | TEMPERATURE: 98.3 F

## 2022-06-06 DIAGNOSIS — F01.50 VASCULAR DEMENTIA WITHOUT BEHAVIORAL DISTURBANCE (HCC): Primary | ICD-10-CM

## 2022-06-06 DIAGNOSIS — B20 HIV DEMENTIA (HCC): ICD-10-CM

## 2022-06-06 DIAGNOSIS — F02.80 HIV DEMENTIA (HCC): ICD-10-CM

## 2022-06-06 PROBLEM — G81.94 LEFT HEMIPARESIS (HCC): Status: ACTIVE | Noted: 2022-06-06

## 2022-06-06 PROCEDURE — 99214 OFFICE O/P EST MOD 30 MIN: CPT | Performed by: STUDENT IN AN ORGANIZED HEALTH CARE EDUCATION/TRAINING PROGRAM

## 2022-06-06 RX ORDER — DONEPEZIL HYDROCHLORIDE 5 MG/1
5 TABLET, FILM COATED ORAL
Qty: 30 TABLET | Refills: 6 | Status: SHIPPED | OUTPATIENT
Start: 2022-06-06 | End: 2022-06-06

## 2022-06-06 RX ORDER — DONEPEZIL HYDROCHLORIDE 5 MG/1
5 TABLET, FILM COATED ORAL
Qty: 30 TABLET | Refills: 6 | Status: SHIPPED | OUTPATIENT
Start: 2022-06-06 | End: 2022-07-06

## 2022-06-06 NOTE — PATIENT INSTRUCTIONS
Donepezil (By mouth)   Donepezil ([id number])  Treats Alzheimer disease. Brand Name(s): Aricept   There may be other brand names for this medicine. When This Medicine Should Not Be Used: This medicine is not right for everyone. Do not use it if you had an allergic reaction to donepezil or to medicines that contain piperidine. How to Use This Medicine:   Tablet, Dissolving Tablet  · Your doctor will tell you how much medicine to use. Do not use more than directed. · Read and follow the patient instructions that come with this medicine. Talk to your doctor or pharmacist if you have any questions. · Tablet: Swallow the 23-mg tablet whole. Do not crush, break, or chew it. · Disintegrating tablet:Make sure your hands are dry before you handle the disintegrating tablet. Peel back the foil from the blister pack, then remove the tablet. Do not push the tablet through the foil. Place the tablet in your mouth. After it has melted, swallow or take a drink of water. · Missed dose: Skip the missed dose and take your next dose at the regular time. Do not take extra medicine to make up for a missed dose. · Store the medicine in a closed container at room temperature, away from heat, moisture, and direct light. Drugs and Foods to Avoid:   Ask your doctor or pharmacist before using any other medicine, including over-the-counter medicines, vitamins, and herbal products. · Some medicines can affect how donepezil works. Tell your doctor if you are using any of the following:   ¨ Bethanechol, carbamazepine, dexamethasone, ketoconazole, phenobarbital, phenytoin, quinidine, or rifampin  ¨ NSAID pain or arthritis medicine (such as aspirin, diclofenac, ibuprofen, naproxen)  Warnings While Using This Medicine:   · Tell your doctor if you are pregnant or breastfeeding, or if you have heart disease, lung disease, asthma or other breathing problems, stomach ulcers or bleeding, or trouble urinating.   · This medicine may cause the following problems:   ¨ Slow heartbeat  ¨ Stomach or bowel bleeding  ¨ Seizures  · Tell any doctor or dentist who treats you that you are using this medicine. You may need to stop using this medicine several days before you have surgery or medical tests. · Your doctor will check your progress and the effects of this medicine at regular visits. Keep all appointments. · Keep all medicine out of the reach of children. Never share your medicine with anyone. Possible Side Effects While Using This Medicine:   Call your doctor right away if you notice any of these side effects:  · Allergic reaction: Itching or hives, swelling in your face or hands, swelling or tingling in your mouth or throat, chest tightness, trouble breathing  · Bloody or black, tarry stools  · Change in how much or how often you urinate  · Chest pain, slow or uneven heartbeat, trouble breathing  · Lightheadedness, dizziness, fainting  · Seizures  · Severe stomach pain  · Unusual bleeding, bruising, or weakness  · Vomiting of blood or material that looks like coffee grounds  If you notice these less serious side effects, talk with your doctor:   · Mild nausea, vomiting, or diarrhea  · Weight loss  If you notice other side effects that you think are caused by this medicine, tell your doctor. Call your doctor for medical advice about side effects. You may report side effects to FDA at 9-845-TGH-0327  © 2017 [facility] Information is for End User's use only and may not be sold, redistributed or otherwise used for commercial purposes. The above information is an  only. It is not intended as medical advice for individual conditions or treatments. Talk to your doctor, nurse or pharmacist before following any medical regimen to see if it is safe and effective for you.

## 2022-06-06 NOTE — PROGRESS NOTES
Abbey Condon is a 49 y.o. male . presents for Cerebrovascular Accident   . HPI  A 49years old male patient with previous stroke and HIV here for follow-up of his previous stroke. He came today with his sister. Last seen in the clinic in June 2020. No new stroke since he was last seen. Continues to have the left-sided weakness. Has difficulty walking from left lower extremity weakness and tends to drag the leg. Not using any support. Patient is not ambulating too much. Lives with his sister who is his primary caretaker. He has forgetfulness and needs help to get his medications. No fever or problems. No difficulty sleeping. In December 2021, his viral load increased: Some problem with the medication. Repeat viral load in February showed increased level. His last CD4 count was 278. His sister also states he has intermittent left big toe swelling and redness with pain. He does not have any current pain. No previous diagnosis of gout. Initial encounter:  Mr. Alton Fonseca is a 54years old male patient with HIV (last CD4 count was 360 in the HIV viral load was undetectable), previous stroke with residual left-sided weakness, and substance abuse (cocaine and marijuana), and HCV infection(cured)came for follow-up of  his stroke. He came today with his sister with whom he lives currently. He continues to have the weakness on the left side of his body. Has difficulty using his left upper extremity. He drags his left lower extremity when walking. He is not using any support. He had episodes of fall; despite this, he refuses to use a cane or walker. Feels numb on the left upper and lower extremities. His speech is clear. No difficulty swallowing or drooling. No dizziness. No passing out spells. He forgets: Asking the same thing again and again, and names. He takes his medications properly [but his sister has to arrange them in a pillbox]. He does not have any new illnesses.   He is taking his HIV medications properly. Undetectable viral load. In May 2018 he was admitted to [facility] for altered mental symptoms, slurring of speech and left-sided weakness and numbness. His MRI showed scattered foci of acute infarction with mild regional mass effect involving the subcortical white matter and basal ganglia. His work-up for a prosthetic infection of the lumbar puncture was negative. Had an unremarkable echocardiography and carotid Doppler ultrasound. His urine exam was positive for cocaine and marijuana.           Review of Systems   Constitutional: Negative for chills, fever and weight loss. HENT: Negative for hearing loss and tinnitus. Eyes: Negative for blurred vision and double vision. Respiratory: Positive for cough. Negative for hemoptysis, sputum production and shortness of breath. Cardiovascular: Negative for chest pain and leg swelling. Gastrointestinal: Negative for nausea and vomiting. Genitourinary: Negative for dysuria, frequency and urgency. Musculoskeletal: Negative for back pain, joint pain and neck pain. Skin: Negative for itching and rash. Neurological: Positive for sensory change (numbness; on the left side), speech change (mild slurring) and focal weakness (left side). Negative for dizziness, tingling, tremors, seizures and headaches. Psychiatric/Behavioral: Positive for memory loss. Negative for depression. The patient does not have insomnia. Past Medical History:   Diagnosis Date    Cerebral artery occlusion with cerebral infarction [facility])     Neurological disorder        No past surgical history on file. No family history on file.      Social History     Socioeconomic History    Marital status: UNKNOWN     Spouse name: Not on file    Number of children: Not on file    Years of education: Not on file    Highest education level: Not on file   Occupational History    Not on file   Tobacco Use    Smoking status: Current Every Day Smoker    Smokeless tobacco: Never Used   Substance and Sexual Activity    Alcohol use: Not Currently    Drug use: Not on file    Sexual activity: Not on file   Other Topics Concern    Not on file   Social History Narrative    Not on file     Social Determinants of Health     Financial Resource Strain:     Difficulty of Paying Living Expenses: Not on file   Food Insecurity:     Worried About Running Out of Food in the Last Year: Not on file    Surjit of Food in the Last Year: Not on file   Transportation Needs:     Lack of Transportation (Medical): Not on file    Lack of Transportation (Non-Medical): Not on file   Physical Activity:     Days of Exercise per Week: Not on file    Minutes of Exercise per Session: Not on file   Stress:     Feeling of Stress : Not on file   Social Connections:     Frequency of Communication with Friends and Family: Not on file    Frequency of Social Gatherings with Friends and Family: Not on file    Attends Sabianist Services: Not on file    Active Member of [address] [place] [address] or Organizations: Not on file    Attends Club or Organization Meetings: Not on file    Marital Status: Not on file   Intimate Partner Violence:     Fear of Current or Ex-Partner: Not on file    Emotionally Abused: Not on file    Physically Abused: Not on file    Sexually Abused: Not on file   Housing Stability:     Unable to Pay for Housing in the Last Year: Not on file    Number of [place] in the Last Year: Not on file    Unstable Housing in the Last Year: Not on file        Allergies   Allergen Reactions    Sting, Bee Swelling    Venom-Honey Bee Swelling         Current Outpatient Medications   Medication Sig Dispense Refill    donepeziL (ARICEPT) 5 mg tablet Take 1 Tablet by mouth nightly for 30 days. 30 Tablet 6    buPROPion SR (WELLBUTRIN SR) 150 mg SR tablet Take  by mouth.  abacavir/dolutegravir/lamivudi (TRIUMEQ PO) Take  by mouth.       aspirin (ASPIRIN) 325 mg tablet Take 325 mg by mouth daily.      gabapentin (NEURONTIN) 100 mg capsule Take  by mouth two (2) times a day. (Patient not taking: Reported on 4/0/5269)      folic acid (FOLVITE) 1 mg tablet Take  by mouth daily. (Patient not taking: Reported on 6/6/2022)      ergocalciferol, vitamin D2, (VITAMIN D2 PO) Take  by mouth. (Patient not taking: Reported on 6/6/2022)      DULoxetine (CYMBALTA) 60 mg capsule Take 60 mg by mouth daily. (Patient not taking: Reported on 6/6/2022)      thiamine HCL (VITAMIN B-1) 100 mg tablet Take  by mouth daily. (Patient not taking: Reported on 6/6/2022)      Leg Brace (ANKLE BRACE) misc by Does Not Apply route. Patient has previous stroke and left leg weakness which is marked at the ankle. Needs ankle brace for walking. (Patient not taking: Reported on 6/6/2022) 1 Each 0         Physical Exam   Head: Normocephalic and atraumatic. Eyes: Pupils are equal, round, and reactive to light. Conjunctivae and EOM are normal.   Neck: Normal range of motion. Neck supple. Cardiovascular: Normal rate and regular rhythm. No murmur heard. Pulmonary/Chest: Effort normal and breath sounds normal. No respiratory distress. He has no wheezes. He has no rales. Musculoskeletal: Normal range of motion. He exhibits no edema or deformity. Neurological:   Mental status: Awake, alert, oriented to person, day and year but not the month/date;  follows simple and complex commands;  no neglect, no extinction to DSS or VSS;  immediate recall 3/3 and delayed recall 2/3. [place] Speech and languge: fluent, coherent,and comprehension intact  CN: VFF, EOMI, PERRLA, face sensation intact , no facial asymmetry noted, palate elevation symmetric bilat, SS+SCM 5/5 bilat, tongue midline  Motor: mild pronator drift on the LUE; increased tone over the left UE and LE. Strength is 5/5 on the right side.  On the left side: 5/5 on elbow flexion and 4+/5 with extension, and hand ; 5/5 hip flexion, knee extension; 4+/5 with knee extension; 3/5 with dorsiflexion and 4/5 with plantar flexion. Sensory: intact to light touch, PP sneses throughout but slightly decreased over the left leg. Coordination: FNF  accurate w/o dysmetria. DTR: 2+ on the right side; 3 + on the left side except at the ankle 4+ with clonus. ; up going plantar on the left. Gait: Hemiplegic; marked difficulty lifting his foot up on the left. No visits with results within 3 Month(s) from this visit. Latest known visit with results is:   No results found for any previous visit. Result Information     Status: Final result (Exam End: 11/6/2019 09:49) Provider Status: Reviewed   Result Notes for MRI BRAIN WO CONT     Notes recorded by Leandro Jose LPN on 50/77/0005 at 12:10 PM EST  Spoke with patient, made aware of provider's comments and reminder to schedule follow-up visit.          Study Result     Brain MR without contrast     HISTORY: Patient with HIV, previous stroke and cognitive dysfunction.     COMPARISON: None available     TECHNIQUE: Brain scanned with sagittal and axial T1W scans, axial T2W , axial  FLAIR, axial diffusion weighted images and SWAN.    FINDINGS:      Old lacunar stroke in the globus pallidus of the right basal ganglia. Associated  susceptibility artifact consistent with hemosiderin staining. Old lacunar stroke  in the anterior left thalamus. Old lacunar stroke in the left lateral briseida. No  restricted diffusion lesions to suggest an acute stroke. Expected arterial flow  voids are present at the base of brain. 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 Cyst contacts the dorsal tectal plate but without significant mass effect. Borderline to mildly age advanced cerebral atrophy. No hydrocephalus. No mass  lesion. Nonpathologic marrow signal. Paranasal sinuses and mastoid air cells are  clear.     IMPRESSION  IMPRESSION:     1. No acute brain disease.     2.  Old lacunar strokes in the right basal ganglia, left thalamus and left briseida.     3. Borderline to mildly age advanced cerebral atrophy.     4. Benign pineal cyst.           ICD-10-CM ICD-9-CM    1. Vascular dementia without behavioral disturbance (HCC)  F01.50 290.40 CT HEAD WO CONT      donepeziL (ARICEPT) 5 mg tablet      DISCONTINUED: donepeziL (ARICEPT) 5 mg tablet   2. HIV dementia ([facility]  B20 [id number] donepeziL (ARICEPT) 5 mg tablet    F02.80 294.10        A 49years old male patient with HIV and previous stroke here for follow-up. Has left-sided hemiparesis which is stable. Risk of falls; refusing to use support. Has worsening memory problems. Needs assistance to take his medications. Forgetfulness could be from a combination of vascular and HIV dementia. We will try him with donepezil 5 mg p.o. per day. We will get a CT scan of his head. I have advised him to follow closely with his primary care provider. He will continue with aspirin.

## 2022-06-20 ENCOUNTER — HOSPITAL ENCOUNTER (OUTPATIENT)
Dept: CT IMAGING | Age: 47
Discharge: HOME | End: 2022-06-20
Attending: STUDENT IN AN ORGANIZED HEALTH CARE EDUCATION/TRAINING PROGRAM
Payer: MEDICAID

## 2022-06-20 DIAGNOSIS — F01.50 VASCULAR DEMENTIA WITHOUT BEHAVIORAL DISTURBANCE (HCC): ICD-10-CM

## 2022-06-20 PROCEDURE — 70450 CT HEAD/BRAIN W/O DYE: CPT
